# Patient Record
Sex: FEMALE | URBAN - METROPOLITAN AREA
[De-identification: names, ages, dates, MRNs, and addresses within clinical notes are randomized per-mention and may not be internally consistent; named-entity substitution may affect disease eponyms.]

---

## 2019-04-03 ENCOUNTER — APPOINTMENT (RX ONLY)
Dept: URBAN - METROPOLITAN AREA CLINIC 39 | Facility: CLINIC | Age: 59
Setting detail: DERMATOLOGY
End: 2019-04-03

## 2019-04-03 DIAGNOSIS — L82.1 OTHER SEBORRHEIC KERATOSIS: ICD-10-CM

## 2019-04-03 DIAGNOSIS — L93.0 DISCOID LUPUS ERYTHEMATOSUS: ICD-10-CM

## 2019-04-03 PROBLEM — L30.9 DERMATITIS, UNSPECIFIED: Status: ACTIVE | Noted: 2019-04-03

## 2019-04-03 PROCEDURE — 99202 OFFICE O/P NEW SF 15 MIN: CPT | Mod: 25

## 2019-04-03 PROCEDURE — 11102 TANGNTL BX SKIN SINGLE LES: CPT

## 2019-04-03 PROCEDURE — ? BIOPSY BY SHAVE METHOD

## 2019-04-03 PROCEDURE — ? COUNSELING

## 2019-04-03 ASSESSMENT — LOCATION DETAILED DESCRIPTION DERM
LOCATION DETAILED: LEFT CENTRAL MALAR CHEEK
LOCATION DETAILED: RIGHT CENTRAL MALAR CHEEK
LOCATION DETAILED: RIGHT CENTRAL PARIETAL SCALP

## 2019-04-03 ASSESSMENT — LOCATION SIMPLE DESCRIPTION DERM
LOCATION SIMPLE: LEFT CHEEK
LOCATION SIMPLE: RIGHT CHEEK
LOCATION SIMPLE: SCALP

## 2019-04-03 ASSESSMENT — LOCATION ZONE DERM
LOCATION ZONE: SCALP
LOCATION ZONE: FACE

## 2019-04-03 NOTE — PROCEDURE: BIOPSY BY SHAVE METHOD
Size Of Lesion In Cm: 0
Post-Care Instructions: I reviewed with the patient in detail post-care instructions. Patient is to keep the biopsy site dry overnight, and then apply bacitracin twice daily until healed. Patient may apply hydrogen peroxide soaks to remove any crusting.
Render Post-Care Instructions In Note?: no
Body Location Override (Optional - Billing Will Still Be Based On Selected Body Map Location If Applicable): right scalp
Consent: Verbal consent was obtained and risks were reviewed including but not limited to scarring, infection, bleeding, scabbing, incomplete removal, nerve damage and allergy to anesthesia.
Type Of Destruction Used: Curettage
Anesthesia Type: 1% lidocaine without epinephrine
Hemostasis: Electrocautery
Detail Level: Simple
Lab: [id number]
Biopsy Method: Dermablade
Billing Type: Third-Party Bill
Electrodesiccation Text: The wound bed was treated with electrodesiccation after the biopsy was performed.
Electrodesiccation And Curettage Text: The wound bed was treated with electrodesiccation and curettage after the biopsy was performed.
Notification Instructions: Patient will be notified of biopsy results. However, patient instructed to call the office if not contacted within 2 weeks.
Depth Of Biopsy: dermis
Anesthesia Volume In Cc (Will Not Render If 0): 0.5
Wound Care: Bacitracin
Dressing: bandage
Was A Bandage Applied: Yes
Silver Nitrate Text: The wound bed was treated with silver nitrate after the biopsy was performed.
Biopsy Type: H and E

## 2019-04-03 NOTE — HPI: SKIN LESION
How Severe Is Your Skin Lesion?: mild
Has Your Skin Lesion Been Treated?: not been treated
Is This A New Presentation, Or A Follow-Up?: Skin Lesion
Additional History: Patient states mom has history of lupus.

## 2019-05-15 ENCOUNTER — RX ONLY (OUTPATIENT)
Age: 59
Setting detail: RX ONLY
End: 2019-05-15

## 2019-05-15 RX ORDER — CLOBETASOL PROPIONATE 0.5 MG/G
OINTMENT TOPICAL
Qty: 1 | Refills: 0 | Status: ERX | COMMUNITY
Start: 2019-05-15

## 2019-11-14 ENCOUNTER — APPOINTMENT (RX ONLY)
Dept: URBAN - METROPOLITAN AREA CLINIC 39 | Facility: CLINIC | Age: 59
Setting detail: DERMATOLOGY
End: 2019-11-14

## 2019-11-14 DIAGNOSIS — L93.0 DISCOID LUPUS ERYTHEMATOSUS: ICD-10-CM

## 2019-11-14 PROBLEM — L30.9 DERMATITIS, UNSPECIFIED: Status: ACTIVE | Noted: 2019-11-14

## 2019-11-14 PROCEDURE — ? INTRALESIONAL KENALOG

## 2019-11-14 PROCEDURE — 11900 INJECT SKIN LESIONS </W 7: CPT

## 2019-11-14 PROCEDURE — ? COUNSELING

## 2019-11-14 PROCEDURE — ? TREATMENT REGIMEN

## 2019-11-14 ASSESSMENT — LOCATION ZONE DERM
LOCATION ZONE: SCALP
LOCATION ZONE: FACE

## 2019-11-14 ASSESSMENT — LOCATION SIMPLE DESCRIPTION DERM
LOCATION SIMPLE: RIGHT SCALP
LOCATION SIMPLE: RIGHT FOREHEAD

## 2019-11-14 ASSESSMENT — LOCATION DETAILED DESCRIPTION DERM
LOCATION DETAILED: RIGHT SUPERIOR FOREHEAD
LOCATION DETAILED: RIGHT CENTRAL FRONTAL SCALP

## 2019-11-14 NOTE — PROCEDURE: INTRALESIONAL KENALOG
Detail Level: Detailed
X Size Of Lesion In Cm (Optional): 0
Kenalog Preparation: Kenalog
Include Z78.9 (Other Specified Conditions Influencing Health Status) As An Associated Diagnosis?: No
Total Volume Injected (Ccs- Only Use Numbers And Decimals): .3
Concentration Of Solution Injected (Mg/Ml): 5.0
Consent: The risks of atrophy were reviewed with the patient.
Medical Necessity Clause: This procedure was medically necessary because the lesions that were treated were:

## 2020-02-10 ENCOUNTER — APPOINTMENT (RX ONLY)
Dept: URBAN - METROPOLITAN AREA CLINIC 39 | Facility: CLINIC | Age: 60
Setting detail: DERMATOLOGY
End: 2020-02-10

## 2020-02-10 DIAGNOSIS — L93.0 DISCOID LUPUS ERYTHEMATOSUS: ICD-10-CM

## 2020-02-10 PROBLEM — J30.1 ALLERGIC RHINITIS DUE TO POLLEN: Status: ACTIVE | Noted: 2020-02-10

## 2020-02-10 PROBLEM — L30.9 DERMATITIS, UNSPECIFIED: Status: ACTIVE | Noted: 2020-02-10

## 2020-02-10 PROCEDURE — ? COUNSELING

## 2020-02-10 PROCEDURE — ? TREATMENT REGIMEN

## 2020-02-10 PROCEDURE — 11900 INJECT SKIN LESIONS </W 7: CPT

## 2020-02-10 PROCEDURE — ? INTRALESIONAL KENALOG

## 2020-02-10 ASSESSMENT — LOCATION SIMPLE DESCRIPTION DERM: LOCATION SIMPLE: RIGHT SCALP

## 2020-02-10 ASSESSMENT — LOCATION ZONE DERM: LOCATION ZONE: SCALP

## 2020-02-10 ASSESSMENT — LOCATION DETAILED DESCRIPTION DERM: LOCATION DETAILED: RIGHT CENTRAL FRONTAL SCALP

## 2020-02-10 NOTE — PROCEDURE: INTRALESIONAL KENALOG
Medical Necessity Clause: This procedure was medically necessary because the lesions that were treated were:
Detail Level: Simple
Kenalog Preparation: Kenalog
X Size Of Lesion In Cm (Optional): 0
Include Z78.9 (Other Specified Conditions Influencing Health Status) As An Associated Diagnosis?: No
Consent: The risks of atrophy were reviewed with the patient.
Total Volume Injected (Ccs- Only Use Numbers And Decimals): 0.4
Concentration Of Solution Injected (Mg/Ml): 5.0

## 2022-01-12 ENCOUNTER — APPOINTMENT (RX ONLY)
Dept: URBAN - METROPOLITAN AREA CLINIC 39 | Facility: CLINIC | Age: 62
Setting detail: DERMATOLOGY
End: 2022-01-12

## 2022-01-12 DIAGNOSIS — L20.89 OTHER ATOPIC DERMATITIS: ICD-10-CM

## 2022-01-12 DIAGNOSIS — L29.8 OTHER PRURITUS: ICD-10-CM

## 2022-01-12 DIAGNOSIS — L29.89 OTHER PRURITUS: ICD-10-CM

## 2022-01-12 PROBLEM — L30.9 DERMATITIS, UNSPECIFIED: Status: ACTIVE | Noted: 2022-01-12

## 2022-01-12 PROCEDURE — ? COUNSELING

## 2022-01-12 PROCEDURE — ? PRESCRIPTION

## 2022-01-12 PROCEDURE — ? ADDITIONAL NOTES

## 2022-01-12 PROCEDURE — 99213 OFFICE O/P EST LOW 20 MIN: CPT

## 2022-01-12 PROCEDURE — ? FULL BODY SKIN EXAM - DECLINED

## 2022-01-12 PROCEDURE — ? TREATMENT REGIMEN

## 2022-01-12 RX ORDER — CLOBETASOL PROPIONATE 0.5 MG/G
OINTMENT TOPICAL
Qty: 60 | Refills: 2 | Status: ERX | COMMUNITY
Start: 2022-01-12

## 2022-01-12 RX ADMIN — CLOBETASOL PROPIONATE: 0.5 OINTMENT TOPICAL at 00:00

## 2022-01-12 ASSESSMENT — LOCATION DETAILED DESCRIPTION DERM
LOCATION DETAILED: RIGHT MEDIAL UPPER BACK
LOCATION DETAILED: LEFT ANTECUBITAL SKIN
LOCATION DETAILED: RIGHT ANTECUBITAL SKIN

## 2022-01-12 ASSESSMENT — LOCATION ZONE DERM
LOCATION ZONE: ARM
LOCATION ZONE: TRUNK

## 2022-01-12 ASSESSMENT — LOCATION SIMPLE DESCRIPTION DERM
LOCATION SIMPLE: RIGHT UPPER ARM
LOCATION SIMPLE: RIGHT UPPER BACK
LOCATION SIMPLE: LEFT UPPER ARM

## 2023-01-17 ENCOUNTER — APPOINTMENT (RX ONLY)
Dept: URBAN - METROPOLITAN AREA CLINIC 39 | Facility: CLINIC | Age: 63
Setting detail: DERMATOLOGY
End: 2023-01-17

## 2023-01-17 DIAGNOSIS — L66.1 LICHEN PLANOPILARIS: ICD-10-CM

## 2023-01-17 DIAGNOSIS — L29.8 OTHER PRURITUS: ICD-10-CM

## 2023-01-17 DIAGNOSIS — L43.2 LICHENOID DRUG REACTION: ICD-10-CM | Status: INADEQUATELY CONTROLLED

## 2023-01-17 DIAGNOSIS — L28.0 LICHEN SIMPLEX CHRONICUS: ICD-10-CM | Status: INADEQUATELY CONTROLLED

## 2023-01-17 DIAGNOSIS — L29.89 OTHER PRURITUS: ICD-10-CM

## 2023-01-17 DIAGNOSIS — L66.11 CLASSIC LICHEN PLANOPILARIS: ICD-10-CM

## 2023-01-17 PROBLEM — L30.9 DERMATITIS, UNSPECIFIED: Status: ACTIVE | Noted: 2023-01-17

## 2023-01-17 PROCEDURE — ? ADDITIONAL NOTES

## 2023-01-17 PROCEDURE — ? FULL BODY SKIN EXAM - DECLINED

## 2023-01-17 PROCEDURE — ? INTRALESIONAL KENALOG

## 2023-01-17 PROCEDURE — 11900 INJECT SKIN LESIONS </W 7: CPT

## 2023-01-17 PROCEDURE — ? COUNSELING

## 2023-01-17 PROCEDURE — ? TREATMENT REGIMEN

## 2023-01-17 PROCEDURE — ? PRESCRIPTION

## 2023-01-17 PROCEDURE — 99214 OFFICE O/P EST MOD 30 MIN: CPT | Mod: 25

## 2023-01-17 RX ORDER — CLOBETASOL PROPIONATE 0.5 MG/G
OINTMENT TOPICAL
Qty: 60 | Refills: 1 | Status: ERX

## 2023-01-17 ASSESSMENT — LOCATION ZONE DERM
LOCATION ZONE: TRUNK
LOCATION ZONE: SCALP

## 2023-01-17 ASSESSMENT — LOCATION SIMPLE DESCRIPTION DERM
LOCATION SIMPLE: SCALP
LOCATION SIMPLE: RIGHT UPPER BACK

## 2023-01-17 ASSESSMENT — LOCATION DETAILED DESCRIPTION DERM
LOCATION DETAILED: RIGHT SUPERIOR UPPER BACK
LOCATION DETAILED: RIGHT CENTRAL PARIETAL SCALP
LOCATION DETAILED: RIGHT SUPERIOR PARIETAL SCALP
LOCATION DETAILED: RIGHT MEDIAL UPPER BACK

## 2023-01-17 NOTE — PROCEDURE: INTRALESIONAL KENALOG
Concentration Of Solution Injected (Mg/Ml): 5.0
How Many Mls Were Removed From The 40 Mg/Ml (1ml) Vial When Preparing The Injectable Solution?: 0
Detail Level: Simple
Total Volume Injected (Ccs- Only Use Numbers And Decimals): 0.2
Kenalog Preparation: Kenalog
Administered By (Optional): BRANDEE
Include Z78.9 (Other Specified Conditions Influencing Health Status) As An Associated Diagnosis?: No
Medical Necessity Clause: This procedure was medically necessary because the lesions that were treated were:
Consent: The risks of atrophy were reviewed with the patient.
Validate Note Data When Using Inventory: Yes

## 2025-06-25 ENCOUNTER — APPOINTMENT (OUTPATIENT)
Dept: URBAN - METROPOLITAN AREA CLINIC 39 | Facility: CLINIC | Age: 65
Setting detail: DERMATOLOGY
End: 2025-06-25

## 2025-06-25 DIAGNOSIS — Z02.9 ENCOUNTER FOR ADMINISTRATIVE EXAMINATIONS, UNSPECIFIED: ICD-10-CM
